# Patient Record
Sex: MALE | Race: ASIAN | NOT HISPANIC OR LATINO | Employment: FULL TIME | URBAN - METROPOLITAN AREA
[De-identification: names, ages, dates, MRNs, and addresses within clinical notes are randomized per-mention and may not be internally consistent; named-entity substitution may affect disease eponyms.]

---

## 2018-04-25 ENCOUNTER — OFFICE VISIT - HEALTHEAST (OUTPATIENT)
Dept: INTERNAL MEDICINE | Facility: CLINIC | Age: 28
End: 2018-04-25

## 2018-04-25 ENCOUNTER — COMMUNICATION - HEALTHEAST (OUTPATIENT)
Dept: TELEHEALTH | Facility: CLINIC | Age: 28
End: 2018-04-25

## 2018-04-25 DIAGNOSIS — R22.31 AXILLARY MASS, RIGHT: ICD-10-CM

## 2018-04-25 ASSESSMENT — MIFFLIN-ST. JEOR: SCORE: 1823.77

## 2018-04-30 ENCOUNTER — OFFICE VISIT - HEALTHEAST (OUTPATIENT)
Dept: INTERNAL MEDICINE | Facility: CLINIC | Age: 28
End: 2018-04-30

## 2018-04-30 DIAGNOSIS — R22.31 AXILLARY MASS, RIGHT: ICD-10-CM

## 2018-10-30 ENCOUNTER — COMMUNICATION - HEALTHEAST (OUTPATIENT)
Dept: FAMILY MEDICINE | Facility: CLINIC | Age: 28
End: 2018-10-30

## 2018-10-30 ENCOUNTER — OFFICE VISIT - HEALTHEAST (OUTPATIENT)
Dept: FAMILY MEDICINE | Facility: CLINIC | Age: 28
End: 2018-10-30

## 2018-10-30 DIAGNOSIS — Z13.1 ENCOUNTER FOR SCREENING FOR DIABETES MELLITUS: ICD-10-CM

## 2018-10-30 DIAGNOSIS — Z13.220 ENCOUNTER FOR SCREENING FOR LIPOID DISORDERS: ICD-10-CM

## 2018-10-30 DIAGNOSIS — Z23 NEED FOR VACCINATION: ICD-10-CM

## 2018-10-30 DIAGNOSIS — Z00.00 ENCOUNTER FOR GENERAL ADULT MEDICAL EXAMINATION WITHOUT ABNORMAL FINDINGS: ICD-10-CM

## 2018-10-30 DIAGNOSIS — E66.811 OBESITY, CLASS I, BMI 30-34.9: ICD-10-CM

## 2018-10-30 LAB
CHOLEST SERPL-MCNC: 230 MG/DL
FASTING STATUS PATIENT QL REPORTED: YES
FASTING STATUS PATIENT QL REPORTED: YES
GLUCOSE BLD-MCNC: 73 MG/DL (ref 70–99)
HDLC SERPL-MCNC: 49 MG/DL
LDLC SERPL CALC-MCNC: 150 MG/DL
TRIGL SERPL-MCNC: 155 MG/DL

## 2018-10-30 ASSESSMENT — MIFFLIN-ST. JEOR: SCORE: 1819.23

## 2018-11-02 ENCOUNTER — COMMUNICATION - HEALTHEAST (OUTPATIENT)
Dept: ADMINISTRATIVE | Facility: CLINIC | Age: 28
End: 2018-11-02

## 2018-11-07 ENCOUNTER — COMMUNICATION - HEALTHEAST (OUTPATIENT)
Dept: FAMILY MEDICINE | Facility: CLINIC | Age: 28
End: 2018-11-07

## 2019-10-29 ENCOUNTER — OFFICE VISIT - HEALTHEAST (OUTPATIENT)
Dept: FAMILY MEDICINE | Facility: CLINIC | Age: 29
End: 2019-10-29

## 2019-10-29 DIAGNOSIS — Z00.01 ENCOUNTER FOR ROUTINE ADULT HEALTH EXAMINATION WITH ABNORMAL FINDINGS: ICD-10-CM

## 2019-10-29 DIAGNOSIS — I49.9 IRREGULAR HEART BEAT: ICD-10-CM

## 2019-10-29 DIAGNOSIS — Z13.1 ENCOUNTER FOR SCREENING FOR DIABETES MELLITUS: ICD-10-CM

## 2019-10-29 DIAGNOSIS — Z13.220 ENCOUNTER FOR SCREENING FOR LIPOID DISORDERS: ICD-10-CM

## 2019-10-29 LAB
ALBUMIN SERPL-MCNC: 4.7 G/DL (ref 3.5–5)
ALP SERPL-CCNC: 44 U/L (ref 45–120)
ALT SERPL W P-5'-P-CCNC: 13 U/L (ref 0–45)
ANION GAP SERPL CALCULATED.3IONS-SCNC: 11 MMOL/L (ref 5–18)
AST SERPL W P-5'-P-CCNC: 15 U/L (ref 0–40)
ATRIAL RATE - MUSE: 56 BPM
BILIRUB SERPL-MCNC: 1 MG/DL (ref 0–1)
BUN SERPL-MCNC: 17 MG/DL (ref 8–22)
CALCIUM SERPL-MCNC: 9.9 MG/DL (ref 8.5–10.5)
CHLORIDE BLD-SCNC: 106 MMOL/L (ref 98–107)
CHOLEST SERPL-MCNC: 237 MG/DL
CO2 SERPL-SCNC: 23 MMOL/L (ref 22–31)
CREAT SERPL-MCNC: 0.89 MG/DL (ref 0.7–1.3)
DIASTOLIC BLOOD PRESSURE - MUSE: NORMAL
FASTING STATUS PATIENT QL REPORTED: YES
GFR SERPL CREATININE-BSD FRML MDRD: >60 ML/MIN/1.73M2
GLUCOSE BLD-MCNC: 81 MG/DL (ref 70–125)
HDLC SERPL-MCNC: 50 MG/DL
INTERPRETATION ECG - MUSE: NORMAL
LDLC SERPL CALC-MCNC: 173 MG/DL
P AXIS - MUSE: 70 DEGREES
POTASSIUM BLD-SCNC: 4.4 MMOL/L (ref 3.5–5)
PR INTERVAL - MUSE: 164 MS
PROT SERPL-MCNC: 7.5 G/DL (ref 6–8)
QRS DURATION - MUSE: 92 MS
QT - MUSE: 412 MS
QTC - MUSE: 397 MS
R AXIS - MUSE: 67 DEGREES
SODIUM SERPL-SCNC: 140 MMOL/L (ref 136–145)
SYSTOLIC BLOOD PRESSURE - MUSE: NORMAL
T AXIS - MUSE: 39 DEGREES
TRIGL SERPL-MCNC: 70 MG/DL
TSH SERPL DL<=0.005 MIU/L-ACNC: 1.86 UIU/ML (ref 0.3–5)
VENTRICULAR RATE- MUSE: 56 BPM

## 2019-10-29 ASSESSMENT — MIFFLIN-ST. JEOR: SCORE: 1633.03

## 2019-11-12 ENCOUNTER — HOSPITAL ENCOUNTER (OUTPATIENT)
Dept: CARDIOLOGY | Facility: HOSPITAL | Age: 29
Discharge: HOME | End: 2019-11-12
Attending: FAMILY MEDICINE

## 2019-11-12 DIAGNOSIS — I49.9 IRREGULAR HEART BEAT: ICD-10-CM

## 2021-06-01 VITALS — WEIGHT: 202.4 LBS | HEIGHT: 67 IN | BODY MASS INDEX: 31.77 KG/M2

## 2021-06-01 VITALS — WEIGHT: 204.1 LBS | BODY MASS INDEX: 32.45 KG/M2

## 2021-06-02 VITALS — WEIGHT: 202.8 LBS | HEIGHT: 66 IN | BODY MASS INDEX: 32.59 KG/M2

## 2021-06-02 NOTE — PROGRESS NOTES
"MALE PREVENTATIVE EXAM    Assessment and Plan:       1. Encounter for routine adult health examination with abnormal findings  Congratulated patient on significant weight loss.  Discussed limiting animal proteins and fats, increasing plant-based protein and fats.    2. Irregular heart beat  I reviewed today's EKG results with patient, cardiology read is pending.  His symptoms are quite infrequent, though he does feel mildly symptomatic.  Would recommend event monitor for further evaluation as well as labs as noted below.  We will discuss additional plan once we have results.  - Electrocardiogram Perform - Clinic  - Comprehensive Metabolic Panel  - Thyroid Cascade  - KEVIN Monitor Hook-Up; Future    3. Encounter for screening for diabetes mellitus  4. Encounter for screening for lipoid disorders  Fasting today, will check labs,, complete biometric screening form and fax for patient.  - Lipid Ray- FASTING     Next follow up:  Return in about 1 month (around 11/29/2019) for Follow Up.    Immunization Review  Adult Imm Review: No immunizations due today    I discussed the following with the patient:   Adult Healthy Living: Importance of regular exercise  Healthy nutrition      Subjective:   Chief Complaint: Dayo Seaman is an 29 y.o. male here for a preventative health visit.     HPI:  Has additional concerns today, patient was made aware of possible split billing.    Patient states he periodically feels that he has an irregular heartbeat or \"skipped beat\".  This does not occur with exercise, though usually at rest.  It used to occur more frequently, though he has lost significant amount of weight over the last several months while participating in ketogenic diet so it does not occur as frequently, though can still occur every 3 to 4 weeks.  It usually only lasts for a few seconds.  Though when it does occur he feels he needs to take deep breath for symptoms to resolve.  No known family history of cardiovascular " "disease.    Requesting biometric screening form to be completed.      Healthy Habits  Are you taking a daily aspirin? No  Do you typically exercising at least 40 min, 3-4 times per week?  Yes  Do you usually eat at least 4 servings of fruit and vegetables a day, include whole grains and fiber and avoid regularly eating high fat foods? NO  Have you had an eye exam in the past two years? Yes  Do you see a dentist twice per year? Yes  Do you have any concerns regarding sleep? No    Safety Screen  If you own firearms, are they secured in a locked gun cabinet or with trigger locks? The patient does not own any firearms  Do you feel you are safe where you are living?: Yes (10/29/2019 11:08 AM)  Do you feel you are safe in your relationship(s)?: Yes (10/29/2019 11:08 AM)      Review of Systems:  Please see above.  The rest of the review of systems are negative for all systems.     Cancer Screening     Patient has no health maintenance due at this time          Patient Care Team:  Mariluz Dennis MD as PCP - General (Family Medicine)  Mariluz Dennis MD as Assigned PCP        History     Reviewed By Date/Time Sections Reviewed    Mariluz Dennis MD 10/29/2019 11:15 AM Medical, Surgical, Tobacco, Alcohol, Drug Use, Sexual Activity, Family    Jaycee Saucedo CMA 10/29/2019 11:09 AM Tobacco            Objective:   Vital Signs:   Visit Vitals  /74 (Patient Site: Right Arm, Patient Position: Sitting, Cuff Size: Adult Regular)   Pulse (!) 58   Resp 16   Ht 5' 6\" (1.676 m)   Wt 162 lb 1.6 oz (73.5 kg)   BMI 26.16 kg/m           PHYSICAL EXAM  General Appearance: Alert, cooperative, no distress, appears stated age  Head: Normocephalic, without obvious abnormality, atraumatic  Eyes: PERRL, conjunctiva/corneas clear, EOM's intact  Ears: Normal TM's and external ear canals, both ears  Nose: Nares normal, septum midline,mucosa normal, no drainage  Throat: Lips, mucosa, and tongue normal; teeth and gums normal  Neck: Supple, " symmetrical, trachea midline, no adenopathy;  thyroid: not enlarged, symmetric, no tenderness/mass/nodules; no carotid bruit or JVD  Lungs: Clear to auscultation bilaterally, respirations unlabored  Heart: mildly bradycardic, otherwise normal rhythm, S1 and S2 normal, no murmur.  Abdomen: Soft, non-tender, bowel sounds active all four quadrants,  no masses, no organomegaly  Genitourinary: Penis normal. No hernias palpated  Musculoskeletal: Normal range of motion. No joint swelling or deformity.   Extremities: Extremities normal, atraumatic, no cyanosis or edema  Skin: Skin color, texture, turgor normal, no rashes or lesions  Lymph nodes: Cervical, supraclavicular, and axillary nodes normal  Neurologic: Alert.  Normal speech.  No focal deficits.  Normal deep tendon reflexes.   Psychiatric: Normal mood and affect.  Does not appear anxious or depressed.               Medication List      as of October 29, 2019 11:47 AM     You have not been prescribed any medications.         Additional Screenings Completed Today:     EKG: By my interpretation bradycardic at 56 bpm, otherwise normal sinus rhythm.  Cardiology read pending.

## 2021-06-03 VITALS
HEART RATE: 58 BPM | DIASTOLIC BLOOD PRESSURE: 74 MMHG | HEIGHT: 66 IN | RESPIRATION RATE: 16 BRPM | BODY MASS INDEX: 26.05 KG/M2 | WEIGHT: 162.1 LBS | SYSTOLIC BLOOD PRESSURE: 122 MMHG

## 2021-06-17 NOTE — PROGRESS NOTES
[facility] Note  Patient Name: Dayo Seaman  Patient Age: 28 y.o.  YOB: 1990  MRN: [id number]  ?  Date of Visit: 4/30/2018  Reason for Office Visit:   Chief Complaint   Patient presents with     Follow-up     cyst in armput, did saw still looks like a lump  there but no more pain       HPI: Dayo Seaman 28 y.o. female who presents to clinic for follow up for axillary cyst drainage. Last week I&D of right axillary mas with some white thick drainage. Packing was inserted and over the weekend he says it drained some more. It has reduced in size but still has a small bump. It is not painful anymore. No redness or warmth. No other concerns       Review of Systems: As noted in HPI     Current Scheduled Meds:  No outpatient encounter prescriptions on file as of 4/30/2018.     No facility-administered encounter medications on file as of 4/30/2018.        Objective / Physical Examination:  /70 (Patient Site: Right Arm, Patient Position: Sitting, Cuff Size: Adult Regular)  Pulse 72  Temp 97.7  F (36.5  C) (Oral)   Wt 204 lb 1.6 oz (92.6 kg)  BMI 32.45 kg/m2  Wt Readings from Last 3 Encounters:   04/30/18 204 lb 1.6 oz (92.6 kg)   04/25/18 202 lb 6.4 oz (91.8 kg)   12/06/17 185 lb (83.9 kg)     Body mass index is 32.45 kg/(m^2). (>25?)    General Appearance: Alert and oriented in no acute distress  Integumentary: right axilla, healing incision, c/d/i. He still has a 1.3 cm x 7 mm area of induration, non tender, no warmth or redness.   Neuro: Alert and oriented, follows commands appropriately.    Assessment / Plan / Medical Decision Making:      Encounter Diagnoses   Name Primary?     Axillary mass, right Yes        1. Axillary mass, right    improved and asymptomatic now. Did drain at home some more. Advised to continue to observe. If gets bigger, becomes painful again he should return and may need second round of drainage and ultrasound. He agreed with plan and will watch it     Total  time spent with patient was 10 minutes with >50% of time spent in face-to-face counseling regarding the above plan     Adalberto Mata MD  [facility]

## 2021-06-17 NOTE — PATIENT INSTRUCTIONS - HE
Patient Instructions by Mariluz eDnnis MD at 10/29/2019 11:10 AM     Author: Mariluz Dennis MD Service: -- Author Type: Physician    Filed: 10/29/2019 11:31 AM Encounter Date: 10/29/2019 Status: Addendum    : Mariluz Dennis MD (Physician)    Related Notes: Original Note by Mariluz Dennis MD (Physician) filed at 10/29/2019 11:24 AM       Patient Education   Understanding USDA MyPlate  The USDA (US Department of Agriculture) has guidelines to help you make healthy food choices. These are called MyPlate. MyPlate shows the food groups that make up healthy meals using the image of a place setting. Before you eat, think about the healthiest choices for what to put onto your plate or into your cup or bowl. To learn more about building a healthy plate, visit www.choosemyplate.gov.       The Food Groups    Fruits: Any fruit or 100% fruit juice counts as part of the Fruit Group. Fruits may be fresh, canned, frozen, or dried, and may be whole, cut-up, or pureed. Make half your plate fruits and vegetables.    Vegetables: Any vegetable or 100% vegetable juice counts as a member of the Vegetable Group. Vegetables may be fresh, frozen, canned, or dried. They can be served raw or cooked and may be whole, cut-up, or mashed. Make half your plate fruits and vegetables.     Grains: All foods made from grains are part of the Grains Group. These include wheat, rice, oats, cornmeal, and barley such as bread, pasta, oatmeal, cereal, tortillas, and grits. Grains should be no more than a quarter of your plate. At least half of your grains should be whole grains.    Protein: This group includes meat, poultry, seafood, beans and peas, eggs, processed soy products (like tofu), nuts (including nut butters), and seeds. Make protein choices no more than a quarter of your plate. Meat and poultry choices should be lean or low fat.    Dairy: All fluid milk products and foods made from milk that contain calcium, like yogurt and cheese  are part of the Dairy Group. (Foods that have little calcium, such as cream, butter, and cream cheese, are not part of the group.) Most dairy choices should be low-fat or fat-free.    Oils: These are fats that are liquid at room temperature. They include canola, corn, olive, soybean, and sunflower oil. Foods that are mainly oil include mayonnaise, certain salad dressings, and soft margarines. You should have only 5 to 7 teaspoons of oils a day. You probably already get this much from the food you eat.  Use [employer] to Help Build Your Meals  The [employer] can help you plan and track your meals and activity. You can look up individual foods to see or compare their nutritional value. You can get guidelines for what and how much you should eat. You can compare your food choices. And you can assess personal physical activities and see ways you can improve. Go to www.G-CON.gov/YourTeamOnlinecker/.    6250-6827 [employer]. [address] [place] [address], [place] [address]. All rights reserved. This information is not intended as a substitute for professional medical care. Always follow your healthcare professional's instructions.

## 2021-06-17 NOTE — PROGRESS NOTES
"[facility] Clinic Note  Patient Name: Dayo Seaman  Patient Age: 28 y.o.  YOB: 1990  MRN: [id number]  ?  Date of Visit: 4/25/2018  Reason for Office Visit:   Chief Complaint   Patient presents with     Mass     in right arm pit. Noticed it a month ago, painful, it has gotten bigger. If he rubs it more and does more it gets inflammed and bigger         HPI: Dayo Seaman 28 y.o. who presents to clinic for right sided axillary mass. Noticed about a month ago. Tender and painful. Fluctuating in size. Looks inflamed sometimes. No drainage. Has been applying warm packs and has gone down at times      Review of Systems: As noted in HPI     Current Scheduled Meds:  No outpatient encounter prescriptions on file as of 4/25/2018.     No facility-administered encounter medications on file as of 4/25/2018.        Objective / Physical Examination:  /80 (Patient Site: Right Arm, Patient Position: Sitting, Cuff Size: Adult Regular)  Pulse 62  Temp 98  F (36.7  C) (Oral)   Ht 5' 6.5\" (1.689 m)  Wt 202 lb 6.4 oz (91.8 kg)  BMI 32.18 kg/m2  Wt Readings from Last 3 Encounters:   04/25/18 202 lb 6.4 oz (91.8 kg)   12/06/17 185 lb (83.9 kg)     Body mass index is 32.18 kg/(m^2). (>25?)    General Appearance: Alert and oriented in no acute distress  Extremities: right axillary fluctuant mass measuring about 1.5 cm, no overlying erythema. Tender to touch.  Integumentary: Warm and dry.  Neuro: Alert and oriented, follows commands appropriately.    Assessment / Plan / Medical Decision Making:      Encounter Diagnoses   Name Primary?     Axillary mass, right Yes        1. Axillary mass, right    PROCEDURE:   A timeout protocol was performed prior to initiating the procedure.  The area was prepared and draped in the usual, sterile manner. The site was anesthetized with 1 % lidocaine with epinephrine. A linear incision along the local skin lines was made and thick white material expressed. The area was explored " thoroughly and sequestered pockets were opened. Bleeding was minimal.     Followup: The patient tolerated the procedure well without complications.  Standard post-procedure care is explained and return precautions are given. He will return Monday to reevaluate     Total time spent with patient was 15 minutes with >50% of time spent in face-to-face counseling regarding the above plan     Adalberto Mata MD  [facility]

## 2021-06-21 NOTE — PROGRESS NOTES
MALE PREVENTATIVE EXAM    Assessment and Plan:       1. Encounter for general adult medical examination without abnormal findings    2. Obesity, Class I, BMI 30-34.9  Discussed healthy lifestyle, regular physical activity, limiting sweets, carbohydrates    3. Need for vaccination  I have discussed the risks and benefits of all of the vaccine components with the patient.  All questions have been answered.    - Influenza, Seasonal,Quad Inj, 36+ MOS    4. Encounter for screening for diabetes mellitus  5. Encounter for screening for lipoid disorders  Fasting today, will check labs.  - Glucose  - Lipid Cascade- FASTING     Next follow up:  No Follow-up on file.    Immunization Review  Adult Imm Review: Due today, orders placed  BMI: discussed, see above      I discussed the following with the patient:   Adult Healthy Living: Importance of regular exercise  Healthy nutrition        Subjective:   Chief Complaint: Dayo Seaman is an 28 y.o. male, new to family medicine, here for a preventative health visit.     HPI:  He requests biometric screening form to be signed today.      Healthy Habits  Are you taking a daily aspirin? No  Do you typically exercising at least 40 min, 3-4 times per week?  Yes, fluctuates, will exercise for periods, then note  Do you usually eat at least 4 servings of fruit and vegetables a day, include whole grains and fiber and avoid regularly eating high fat foods? no  Have you had an eye exam in the past two years? n/a  Do you see a dentist twice per year? n/a  Do you have any concerns regarding sleep?     Safety Screen  If you own firearms, are they secured in a locked gun cabinet or with trigger locks?   Do you feel you are safe where you are living?: Yes (10/30/2018  1:46 PM)  Do you feel you are safe in your relationship(s)?: Yes (10/30/2018  1:46 PM)    Review of Systems:  Please see above.  The rest of the review of systems are negative for all systems.     Cancer Screening     Patient has no  "health maintenance due at this time          Patient Care Team:  No Primary Care Provider as PCP - General        History     Reviewed By Date/Time Sections Reviewed    Mariluz Dennis MD 10/30/2018  1:56 PM Social Documentation    Mariluz Dennis MD 10/30/2018  1:54 PM Medical, Surgical, Family    Estelle Medina, [facility] 10/30/2018  1:50 PM Tobacco            Objective:   Vital Signs:   Visit Vitals     /64 (Patient Site: Right Arm, Patient Position: Sitting, Cuff Size: Adult Large)     Pulse 72     Ht 5' 6.1\" (1.679 m)     Wt 202 lb 12.8 oz (92 kg)     BMI 32.63 kg/m2          PHYSICAL EXAM  General Appearance: Alert, cooperative, no distress, appears stated age  Head: Normocephalic, without obvious abnormality, atraumatic  Eyes: PERRL, conjunctiva/corneas clear, EOM's intact  Ears: Normal TM's and external ear canals, both ears  Nose: Nares normal, septum midline,mucosa normal, no drainage  Throat: Lips, mucosa, and tongue normal; teeth and gums normal  Neck: Supple, symmetrical, trachea midline, no adenopathy;  thyroid: not enlarged, symmetric, no tenderness/mass/nodules; no carotid bruit or JVD  Lungs: Clear to auscultation bilaterally, respirations unlabored  Heart: Regular rate and rhythm, S1 and S2 normal, no murmur.  Abdomen: Soft, non-tender, bowel sounds active all four quadrants,  no masses, no organomegaly  Genitourinary: Penis normal. No hernias palpated  Musculoskeletal: Normal range of motion. No joint swelling or deformity.   Extremities: Extremities normal, atraumatic, no cyanosis or edema  Skin: Skin color, texture, turgor normal, no rashes or lesions  Lymph nodes: Cervical, supraclavicular, and axillary nodes normal  Neurologic: Alert.  Normal speech.  No focal deficits.  Normal deep tendon reflexes.   Psychiatric: Normal mood and affect.  Does not appear anxious or depressed.               Medication List      Notice  As of 10/30/2018  4:42 PM    You have not been prescribed any " medications.          Additional Screenings Completed Today:

## 2021-06-26 ENCOUNTER — HEALTH MAINTENANCE LETTER (OUTPATIENT)
Age: 31
End: 2021-06-26

## 2021-10-16 ENCOUNTER — HEALTH MAINTENANCE LETTER (OUTPATIENT)
Age: 31
End: 2021-10-16

## 2022-07-23 ENCOUNTER — HEALTH MAINTENANCE LETTER (OUTPATIENT)
Age: 32
End: 2022-07-23

## 2022-10-01 ENCOUNTER — HEALTH MAINTENANCE LETTER (OUTPATIENT)
Age: 32
End: 2022-10-01

## 2023-01-23 ENCOUNTER — OFFICE VISIT (OUTPATIENT)
Dept: FAMILY MEDICINE | Facility: CLINIC | Age: 33
End: 2023-01-23
Payer: COMMERCIAL

## 2023-01-23 VITALS
BODY MASS INDEX: 34.16 KG/M2 | DIASTOLIC BLOOD PRESSURE: 78 MMHG | TEMPERATURE: 97.5 F | OXYGEN SATURATION: 94 % | WEIGHT: 205 LBS | HEIGHT: 65 IN | RESPIRATION RATE: 16 BRPM | SYSTOLIC BLOOD PRESSURE: 117 MMHG | HEART RATE: 60 BPM

## 2023-01-23 DIAGNOSIS — R07.81 RIB PAIN: Primary | ICD-10-CM

## 2023-01-23 PROCEDURE — 99203 OFFICE O/P NEW LOW 30 MIN: CPT | Performed by: NURSE PRACTITIONER

## 2023-01-23 RX ORDER — METHOCARBAMOL 500 MG/1
500 TABLET, FILM COATED ORAL 4 TIMES DAILY PRN
Qty: 60 TABLET | Refills: 0 | Status: SHIPPED | OUTPATIENT
Start: 2023-01-23

## 2023-01-23 ASSESSMENT — PAIN SCALES - GENERAL: PAINLEVEL: MILD PAIN (2)

## 2023-01-23 NOTE — PROGRESS NOTES
"  Assessment & Plan     Rib pain  Symptoms most consistent with musculoskeletal pain or costochondritis.  We discussed trying NSAID/tylenol and can also add robaxin. He can also continue topicals.  If symptoms worsen or persist we can consider CRX vs abdominal ultrasound or trial of PPI.     - methocarbamol (ROBAXIN) 500 MG tablet; Take 1 tablet (500 mg) by mouth 4 times daily as needed for muscle spasms    Prescription drug management         BMI:   Estimated body mass index is 33.95 kg/m  as calculated from the following:    Height as of this encounter: 1.655 m (5' 5.16\").    Weight as of this encounter: 93 kg (205 lb).   Weight management plan: Discussed healthy diet and exercise guidelines        No follow-ups on file.   Follow-up Visit   Expected date:  Mar 23, 2023 (Approximate)      Follow Up Appointment Details:     Follow-up with whom?: Me    Follow-Up for what?: Adult Preventive    How?: In Person                    SAMM Larsen CNP  [facility]    Corie Oshea is a 32 year old, presenting for the following health issues:  Chest Pain (Started 2 months ago)      History of Present Illness       Reason for visit:  Chest pain and check up  Symptom onset:  More than a month  Symptoms include:  Chest pain  Symptom intensity:  Mild  Symptom progression:  Improving  Had these symptoms before:  No    He eats 0-1 servings of fruits and vegetables daily.He consumes 0 sweetened beverage(s) daily.He exercises with enough effort to increase his heart rate 30 to 60 minutes per day.  He exercises with enough effort to increase his heart rate 4 days per week.   He is taking medications regularly.       Had COVID in the summer with a cough.  Since around Thanksgiving, developed epigastric/rib pain when bending, twisting, coughing.  A week ago, worsened up in the ribs and sternal area.    Did change diet a lot the last couple weeks.  Overall symptoms are improved since last " "week.  Salonpas helps for a while.  If not paying attention, it is usually doesn't notice it.  Has been back on the treadmill again, trying to eat better  Pain is not increased with eating.  Does not feel it is reflux, localized more in the rib cage. Not noticing any palpitations or dyspnea.  Has tried tums, doesn't change the pain      Review of Systems   Constitutional, HEENT, cardiovascular, pulmonary, gi and gu systems are negative, except as otherwise noted.      Objective    /78 (BP Location: Right arm, Patient Position: Sitting, Cuff Size: Adult Large)   Pulse 60   Temp 97.5  F (36.4  C) (Tympanic)   Resp 16   Ht 1.655 m (5' 5.16\")   Wt 93 kg (205 lb)   SpO2 94%   BMI 33.95 kg/m    Body mass index is 33.95 kg/m .  Physical Exam   GENERAL: healthy, alert and no distress  EYES: Eyes grossly normal to inspection, PERRL and conjunctivae and sclerae normal  NECK: no adenopathy, no asymmetry, masses, or scars and thyroid normal to palpation  RESP: lungs clear to auscultation - no rales, rhonchi or wheezes  CV: regular rate and rhythm, normal S1 S2, no S3 or S4, no murmur, click or rub, no peripheral edema and peripheral pulses strong  ABDOMEN: soft, nontender, no hepatosplenomegaly, no masses and bowel sounds normal  MS: no gross musculoskeletal defects noted, no edema                    "

## 2023-08-06 ENCOUNTER — HEALTH MAINTENANCE LETTER (OUTPATIENT)
Age: 33
End: 2023-08-06

## 2023-11-20 ENCOUNTER — HOSPITAL ENCOUNTER (EMERGENCY)
Facility: HOSPITAL | Age: 33
Discharge: HOME | End: 2023-11-20
Attending: STUDENT IN AN ORGANIZED HEALTH CARE EDUCATION/TRAINING PROGRAM | Admitting: STUDENT IN AN ORGANIZED HEALTH CARE EDUCATION/TRAINING PROGRAM
Payer: COMMERCIAL

## 2023-11-20 VITALS
DIASTOLIC BLOOD PRESSURE: 62 MMHG | HEIGHT: 66 IN | BODY MASS INDEX: 32.95 KG/M2 | HEART RATE: 74 BPM | RESPIRATION RATE: 18 BRPM | OXYGEN SATURATION: 96 % | WEIGHT: 205 LBS | SYSTOLIC BLOOD PRESSURE: 115 MMHG | TEMPERATURE: 98.8 F

## 2023-11-20 DIAGNOSIS — R06.6 HICCUPS: ICD-10-CM

## 2023-11-20 LAB
ALBUMIN SERPL BCG-MCNC: 4.6 G/DL (ref 3.5–5.2)
ALP SERPL-CCNC: 37 U/L (ref 40–150)
ALT SERPL W P-5'-P-CCNC: 81 U/L (ref 0–70)
ANION GAP SERPL CALCULATED.3IONS-SCNC: 10 MMOL/L (ref 7–15)
AST SERPL W P-5'-P-CCNC: 38 U/L (ref 0–45)
BASOPHILS # BLD AUTO: 0 10E3/UL (ref 0–0.2)
BASOPHILS NFR BLD AUTO: 1 %
BILIRUB SERPL-MCNC: 0.6 MG/DL
BUN SERPL-MCNC: 11.7 MG/DL (ref 6–20)
CALCIUM SERPL-MCNC: 9.1 MG/DL (ref 8.6–10)
CHLORIDE SERPL-SCNC: 102 MMOL/L (ref 98–107)
CREAT SERPL-MCNC: 0.82 MG/DL (ref 0.67–1.17)
DEPRECATED HCO3 PLAS-SCNC: 26 MMOL/L (ref 22–29)
EGFRCR SERPLBLD CKD-EPI 2021: >90 ML/MIN/1.73M2
EOSINOPHIL # BLD AUTO: 0.3 10E3/UL (ref 0–0.7)
EOSINOPHIL NFR BLD AUTO: 3 %
ERYTHROCYTE [DISTWIDTH] IN BLOOD BY AUTOMATED COUNT: 12.9 % (ref 10–15)
GLUCOSE SERPL-MCNC: 104 MG/DL (ref 70–99)
HCT VFR BLD AUTO: 43.6 % (ref 40–53)
HGB BLD-MCNC: 14.7 G/DL (ref 13.3–17.7)
IMM GRANULOCYTES # BLD: 0 10E3/UL
IMM GRANULOCYTES NFR BLD: 0 %
LYMPHOCYTES # BLD AUTO: 2 10E3/UL (ref 0.8–5.3)
LYMPHOCYTES NFR BLD AUTO: 25 %
MCH RBC QN AUTO: 29.8 PG (ref 26.5–33)
MCHC RBC AUTO-ENTMCNC: 33.7 G/DL (ref 31.5–36.5)
MCV RBC AUTO: 88 FL (ref 78–100)
MONOCYTES # BLD AUTO: 0.6 10E3/UL (ref 0–1.3)
MONOCYTES NFR BLD AUTO: 8 %
NEUTROPHILS # BLD AUTO: 5 10E3/UL (ref 1.6–8.3)
NEUTROPHILS NFR BLD AUTO: 63 %
NRBC # BLD AUTO: 0 10E3/UL
NRBC BLD AUTO-RTO: 0 /100
PLATELET # BLD AUTO: 267 10E3/UL (ref 150–450)
POTASSIUM SERPL-SCNC: 3.8 MMOL/L (ref 3.4–5.3)
PROT SERPL-MCNC: 7 G/DL (ref 6.4–8.3)
RBC # BLD AUTO: 4.94 10E6/UL (ref 4.4–5.9)
SODIUM SERPL-SCNC: 138 MMOL/L (ref 135–145)
WBC # BLD AUTO: 7.9 10E3/UL (ref 4–11)

## 2023-11-20 PROCEDURE — 85014 HEMATOCRIT: CPT | Performed by: STUDENT IN AN ORGANIZED HEALTH CARE EDUCATION/TRAINING PROGRAM

## 2023-11-20 PROCEDURE — 250N000011 HC RX IP 250 OP 636: Mod: JZ | Performed by: STUDENT IN AN ORGANIZED HEALTH CARE EDUCATION/TRAINING PROGRAM

## 2023-11-20 PROCEDURE — 96374 THER/PROPH/DIAG INJ IV PUSH: CPT

## 2023-11-20 PROCEDURE — 36415 COLL VENOUS BLD VENIPUNCTURE: CPT | Performed by: STUDENT IN AN ORGANIZED HEALTH CARE EDUCATION/TRAINING PROGRAM

## 2023-11-20 PROCEDURE — 80053 COMPREHEN METABOLIC PANEL: CPT | Performed by: STUDENT IN AN ORGANIZED HEALTH CARE EDUCATION/TRAINING PROGRAM

## 2023-11-20 PROCEDURE — 99284 EMERGENCY DEPT VISIT MOD MDM: CPT | Mod: 25

## 2023-11-20 RX ORDER — METOCLOPRAMIDE HYDROCHLORIDE 5 MG/ML
10 INJECTION INTRAMUSCULAR; INTRAVENOUS ONCE
Status: COMPLETED | OUTPATIENT
Start: 2023-11-20 | End: 2023-11-20

## 2023-11-20 RX ORDER — FAMOTIDINE 20 MG/1
20 TABLET, FILM COATED ORAL 2 TIMES DAILY
Qty: 60 TABLET | Refills: 0 | Status: SHIPPED | OUTPATIENT
Start: 2023-11-20 | End: 2023-12-20

## 2023-11-20 RX ORDER — METOCLOPRAMIDE 10 MG/1
10 TABLET ORAL 3 TIMES DAILY PRN
Qty: 12 TABLET | Refills: 0 | Status: SHIPPED | OUTPATIENT
Start: 2023-11-20

## 2023-11-20 RX ADMIN — METOCLOPRAMIDE 10 MG: 5 INJECTION, SOLUTION INTRAMUSCULAR; INTRAVENOUS at 05:43

## 2023-11-20 ASSESSMENT — ACTIVITIES OF DAILY LIVING (ADL): ADLS_ACUITY_SCORE: 35

## 2023-11-20 NOTE — ED PROVIDER NOTES
NAME: Dayo Seaman  AGE: 33 year old male  YOB: 1990  MRN: [id number]  EVALUATION DATE & TIME: 2023  4:34 AM    PCP: No Ref-Primary, Physician  ED PROVIDER: Lexi Granados MD.    Chief Complaint   Patient presents with    Hiccups       FINAL IMPRESSION:  1. Hiccups        MEDICAL DECISION MAKIN:24 AM I met with the patient, obtained history, performed an initial exam, and discussed options and plan for diagnostics and treatment here in the ED.  ED Course as of 23 0805      0630 Rechecked on patient     MDM: 34 y/o M who presents with hiccups. Has had them for ~32 hours. He thinks it started after he was chugging some beer while out with friends. Labs are reassuring with just mild ALT elevation noted. With no chest pain, abdominal pain, fevers or other red flags did not feel required emergent imaging such as CT chest or abdomen and patient in agreement with not getting this. No chest pain or shortness of breath, doubt ACS. His hiccups actually stopped here before medication but he states they come and go frequently so did give a dose of reglan here. Continued to not have hiccups on reassessment. He feels comfortable with discharge with reglan and pepcid rx with close outpatient follow up. Strict return precautions discussed and patient is in agreement with plan, endorses understanding and their questions were answered.    Medical Decision Making    History:  Supplemental history from: Documented in chart, if applicable and Family Member/Significant Other  External Record(s) reviewed: Documented in chart, if applicable.    Work Up:  Chart documentation includes differential considered and any EKGs or imaging interpreted by provider.  In additional to work up documented, I considered the following work up: Documented in chart, if applicable.    External consultation:  Discussion of management with another provider: Documented in chart, if applicable    Complicating  factors:  Care impacted by chronic illness: N/A  Care affected by social determinants of health: N/A    Disposition considerations: Discharge. I prescribed additional prescription strength medication(s) as charted. N/A.    MEDICATIONS GIVEN IN THE EMERGENCY:  Medications   metoclopramide (REGLAN) injection 10 mg (10 mg Intravenous $Given 11/20/23 0543)       NEW PRESCRIPTIONS STARTED AT TODAY'S ER VISIT:  Discharge Medication List as of 11/20/2023  6:33 AM        START taking these medications    Details   famotidine (PEPCID) 20 MG tablet Take 1 tablet (20 mg) by mouth 2 times daily for 30 days, Disp-60 tablet, R-0, Local Print      metoclopramide (REGLAN) 10 MG tablet Take 1 tablet (10 mg) by mouth 3 times daily as needed (hiccups), Disp-12 tablet, R-0, Local Print              =================================================================  HPI    Patient information was obtained from: patient  Use of : N/A       Dayo Seaman is a 33 year old male with no significant pertinent PMHx who presents with hiccups. Reports for last 32 hours or so has had intermittent hiccups that won't go away. He thinks it started after he was chugging some beer. He does not drink daily (about 1 x a week) but was out with friends at that time. He reports no h/o of this. No chest pain, shortness of breath, fevers, cough, vomiting, diarrhea or other symptoms.    PAST MEDICAL HISTORY:  No past medical history on file.    PAST SURGICAL HISTORY:  Past Surgical History:   Procedure Laterality Date    NO PAST SURGERIES         CURRENT MEDICATIONS:    No current facility-administered medications for this encounter.    Current Outpatient Medications:     famotidine (PEPCID) 20 MG tablet, Take 1 tablet (20 mg) by mouth 2 times daily for 30 days, Disp: 60 tablet, Rfl: 0    metoclopramide (REGLAN) 10 MG tablet, Take 1 tablet (10 mg) by mouth 3 times daily as needed (hiccups), Disp: 12 tablet, Rfl: 0    methocarbamol (ROBAXIN) 500 MG  "tablet, Take 1 tablet (500 mg) by mouth 4 times daily as needed for muscle spasms, Disp: 60 tablet, Rfl: 0    ALLERGIES:  No Known Allergies    FAMILY HISTORY:  Family History   Problem Relation Age of Onset    Kidney failure Father         on dialysis       SOCIAL HISTORY:   Social History     Socioeconomic History    Marital status: Single   Tobacco Use    Smoking status: Never    Smokeless tobacco: Never   Vaping Use    Vaping Use: Never used   Substance and Sexual Activity    Alcohol use: Yes     Comment: Alcoholic Drinks/day: socially; drinks once a month    Drug use: No    Sexual activity: Yes     Partners: Female   Social History Narrative    . Works as a realtor. Has 2 sons age 5 and 1       PHYSICAL EXAM:    Vitals: /62   Pulse 74   Temp 98.8  F (37.1  C) (Oral)   Resp 18   Ht 1.676 m (5' 6\")   Wt 93 kg (205 lb)   SpO2 96%   BMI 33.09 kg/m     Constitutional: Well developed, well nourished. No acute distress. No current hiccups  HENT: Normocephalic, atraumatic. Neck-gross ROM intact.   Eyes: Pupils mid-range, sclera white  Respiratory: CTAB, no respiratory distress  Cardiovascular: Normal heart rate, regular rhythm.  GI: Soft, not distended, non tender, no guarding  Musculoskeletal: Moving extremities intentionally and without pain. No obvious deformity.  Skin: Warm, dry, no rash.  Neurologic: Alert & oriented, speech clear, no focal deficits noted    LAB:  All pertinent labs reviewed and interpreted.  Labs Ordered and Resulted from Time of ED Arrival to Time of ED Departure   COMPREHENSIVE METABOLIC PANEL - Abnormal       Result Value    Sodium 138      Potassium 3.8      Carbon Dioxide (CO2) 26      Anion Gap 10      Urea Nitrogen 11.7      Creatinine 0.82      GFR Estimate >90      Calcium 9.1      Chloride 102      Glucose 104 (*)     Alkaline Phosphatase 37 (*)     AST 38      ALT 81 (*)     Protein Total 7.0      Albumin 4.6      Bilirubin Total 0.6     CBC WITH PLATELETS AND " DIFFERENTIAL    WBC Count 7.9      RBC Count 4.94      Hemoglobin 14.7      Hematocrit 43.6      MCV 88      MCH 29.8      MCHC 33.7      RDW 12.9      Platelet Count 267      % Neutrophils 63      % Lymphocytes 25      % Monocytes 8      % Eosinophils 3      % Basophils 1      % Immature Granulocytes 0      NRBCs per 100 WBC 0      Absolute Neutrophils 5.0      Absolute Lymphocytes 2.0      Absolute Monocytes 0.6      Absolute Eosinophils 0.3      Absolute Basophils 0.0      Absolute Immature Granulocytes 0.0      Absolute NRBCs 0.0         RADIOLOGY:  No orders to display       EKG:   N/A    PROCEDURES:   Procedures       Lexi Granados M.D.  Emergency Medicine  [facility] EMERGENCY DEPARTMENT  [address] [place] [id number]  [phone]  Dept: [phone]       Lexi Granados MD  11/20/23 0808

## 2023-11-20 NOTE — DISCHARGE INSTRUCTIONS
Start medication for reflux  Can take reglan as needed  Return to the Emergency Room with chest pain, difficulty breathing, abdominal pain or other worsening symptoms or concerns  Follow up closely with your primary care doctor  Avoid heavy alcohol use

## 2024-09-29 ENCOUNTER — HEALTH MAINTENANCE LETTER (OUTPATIENT)
Age: 34
End: 2024-09-29